# Patient Record
Sex: MALE | URBAN - METROPOLITAN AREA
[De-identification: names, ages, dates, MRNs, and addresses within clinical notes are randomized per-mention and may not be internally consistent; named-entity substitution may affect disease eponyms.]

---

## 2024-07-22 ENCOUNTER — OFFICE VISIT (OUTPATIENT)
Dept: URBAN - METROPOLITAN AREA CLINIC 54 | Facility: CLINIC | Age: 64
End: 2024-07-22
Payer: COMMERCIAL

## 2024-07-22 ENCOUNTER — DASHBOARD ENCOUNTERS (OUTPATIENT)
Age: 64
End: 2024-07-22

## 2024-07-22 VITALS
SYSTOLIC BLOOD PRESSURE: 131 MMHG | HEIGHT: 72 IN | DIASTOLIC BLOOD PRESSURE: 74 MMHG | TEMPERATURE: 98.1 F | WEIGHT: 209 LBS | HEART RATE: 74 BPM | BODY MASS INDEX: 28.31 KG/M2

## 2024-07-22 DIAGNOSIS — Z12.11 COLON CANCER SCREENING: ICD-10-CM

## 2024-07-22 PROCEDURE — 99242 OFF/OP CONSLTJ NEW/EST SF 20: CPT | Performed by: PHYSICIAN ASSISTANT

## 2024-07-22 PROCEDURE — 99202 OFFICE O/P NEW SF 15 MIN: CPT | Performed by: PHYSICIAN ASSISTANT

## 2024-07-22 RX ORDER — ZOSTER VACCINE RECOMBINANT, ADJUVANTED 50 MCG/0.5
AS DIRECTED KIT INTRAMUSCULAR
Status: ACTIVE | COMMUNITY

## 2024-07-22 RX ORDER — MONTELUKAST 10 MG/1
1 TABLET TABLET, FILM COATED ORAL ONCE A DAY
Status: ACTIVE | COMMUNITY

## 2024-07-22 RX ORDER — RESPIRATORY SYNCYTIAL VIRUS VACCINE 120MCG/0.5
AS DIRECTED KIT INTRAMUSCULAR
Status: ACTIVE | COMMUNITY

## 2024-07-22 RX ORDER — TADALAFIL 20 MG/1
1 TABLET AS NEEDED TABLET, FILM COATED ORAL ONCE A DAY
Status: ACTIVE | COMMUNITY

## 2024-07-22 RX ORDER — LISINOPRIL AND HYDROCHLOROTHIAZIDE 10; 12.5 MG/1; MG/1
1 TABLET TABLET ORAL ONCE A DAY
Status: ACTIVE | COMMUNITY

## 2024-07-22 RX ORDER — AMLODIPINE BESYLATE 10 MG/1
1 TABLET TABLET ORAL ONCE A DAY
Status: ACTIVE | COMMUNITY

## 2024-07-22 RX ORDER — LEVOTHYROXINE SODIUM 88 UG/1
1 TABLET IN THE MORNING ON AN EMPTY STOMACH TABLET ORAL ONCE A DAY
Status: ACTIVE | COMMUNITY

## 2024-07-22 RX ORDER — ROSUVASTATIN CALCIUM 10 MG/1
1 TABLET TABLET, COATED ORAL ONCE A DAY
Status: ACTIVE | COMMUNITY

## 2024-07-22 NOTE — PHYSICAL EXAM CHEST:
[address]            (For Outpatient Use Only) Initial Admit Date: 3/10/2020   Inpt/Obs Admit Date: Inpt: 3/10/20 / Obs: N/A   Discharge Date:    Yumi Lusty:  [de-identified]   MRN: [de-identified]   CSN: [id number]   CEID: [id number] No tachypnea. No respiratory distress.

## 2024-07-22 NOTE — HPI-TODAY'S VISIT:
Rafael Banegas is a 63-year-old male patient with PMH of HTN, HLD, hypothyroidism, who was referred to clinic by Dr. Jt Gonzales for colon cancer screening.  A copy of this document will be sent to the referring provider. Last colonoscopy was 10+ years ago. No hx of polyps or fam hx of CRC. No signs of bleeding. Stopped Wegovy 1.5 months ago 2/2 new onset of constipation. His BMs are attempting to normalize, but he will still occasionally go 3 days without BM. Recent labs show normal lipid panel, hemoglobin A1c at 5.8%.  Unremarkable CMP and CBC.  Normal TSH.  No recent abdominal imaging on file. No known cardiac or pulm issues. No issues with anesthesia previously.

## 2024-08-29 ENCOUNTER — OFFICE VISIT (OUTPATIENT)
Dept: URBAN - METROPOLITAN AREA SURGERY CENTER 14 | Facility: SURGERY CENTER | Age: 64
End: 2024-08-29

## 2024-09-18 ENCOUNTER — OFFICE VISIT (OUTPATIENT)
Dept: URBAN - METROPOLITAN AREA CLINIC 54 | Facility: CLINIC | Age: 64
End: 2024-09-18
Payer: COMMERCIAL

## 2024-09-18 VITALS
SYSTOLIC BLOOD PRESSURE: 129 MMHG | WEIGHT: 210 LBS | DIASTOLIC BLOOD PRESSURE: 80 MMHG | TEMPERATURE: 98.2 F | HEART RATE: 65 BPM | BODY MASS INDEX: 28.44 KG/M2 | HEIGHT: 72 IN

## 2024-09-18 DIAGNOSIS — K57.30 ACQUIRED DIVERTICULOSIS OF COLON: ICD-10-CM

## 2024-09-18 DIAGNOSIS — Z86.010 PERSONAL HISTORY OF COLONIC POLYPS: ICD-10-CM

## 2024-09-18 PROBLEM — 397881000: Status: ACTIVE | Noted: 2024-09-18

## 2024-09-18 PROBLEM — 428283002: Status: ACTIVE | Noted: 2024-09-18

## 2024-09-18 PROCEDURE — 99213 OFFICE O/P EST LOW 20 MIN: CPT | Performed by: PHYSICIAN ASSISTANT

## 2024-09-18 RX ORDER — AMLODIPINE BESYLATE 10 MG/1
1 TABLET TABLET ORAL ONCE A DAY
Status: ACTIVE | COMMUNITY

## 2024-09-18 RX ORDER — ZOSTER VACCINE RECOMBINANT, ADJUVANTED 50 MCG/0.5
AS DIRECTED KIT INTRAMUSCULAR
Status: ACTIVE | COMMUNITY

## 2024-09-18 RX ORDER — TADALAFIL 20 MG/1
1 TABLET AS NEEDED TABLET, FILM COATED ORAL ONCE A DAY
Status: ACTIVE | COMMUNITY

## 2024-09-18 RX ORDER — MONTELUKAST 10 MG/1
1 TABLET TABLET, FILM COATED ORAL ONCE A DAY
Status: ACTIVE | COMMUNITY

## 2024-09-18 RX ORDER — RESPIRATORY SYNCYTIAL VIRUS VACCINE 120MCG/0.5
AS DIRECTED KIT INTRAMUSCULAR
Status: ACTIVE | COMMUNITY

## 2024-09-18 RX ORDER — LISINOPRIL AND HYDROCHLOROTHIAZIDE 10; 12.5 MG/1; MG/1
1 TABLET TABLET ORAL ONCE A DAY
Status: ACTIVE | COMMUNITY

## 2024-09-18 RX ORDER — ROSUVASTATIN CALCIUM 10 MG/1
1 TABLET TABLET, COATED ORAL ONCE A DAY
Status: ACTIVE | COMMUNITY

## 2024-09-18 RX ORDER — LEVOTHYROXINE SODIUM 88 UG/1
1 TABLET IN THE MORNING ON AN EMPTY STOMACH TABLET ORAL ONCE A DAY
Status: ACTIVE | COMMUNITY

## 2024-09-18 NOTE — HPI-TODAY'S VISIT:
Rafael Banegas is a 63-year-old male patient with PMH of HTN, HLD, hypothyroidism, who was referred to clinic by Dr. Jt Gonzales for colon cancer screening.  A copy of this document will be sent to the referring provider. Last colonoscopy was 10+ years ago. No hx of polyps or fam hx of CRC. No signs of bleeding. Stopped Wegovy 1.5 months ago 2/2 new onset of constipation. His BMs are attempting to normalize, but he will still occasionally go 3 days without BM. Recent labs show normal lipid panel, hemoglobin A1c at 5.8%.  Unremarkable CMP and CBC.  Normal TSH.  No recent abdominal imaging on file. No known cardiac or pulm issues. No issues with anesthesia previously.  9/18/2024: Patient presents for routine follow-up colonoscopy on 8/29/2024 with Dr. Hawley shows one 4-6 mm TA polyp, diverticulosis in the entire examined colon, and IH.  Patient was recommended to repeat colonoscopy in 7 years. No post op issues. No questions.